# Patient Record
Sex: MALE | Race: WHITE | URBAN - NONMETROPOLITAN AREA
[De-identification: names, ages, dates, MRNs, and addresses within clinical notes are randomized per-mention and may not be internally consistent; named-entity substitution may affect disease eponyms.]

---

## 2021-11-14 ENCOUNTER — TELEPHONE (OUTPATIENT)
Dept: PRIMARY CARE CLINIC | Age: 56
End: 2021-11-14

## 2021-11-14 ENCOUNTER — OFFICE VISIT (OUTPATIENT)
Dept: PRIMARY CARE CLINIC | Age: 56
End: 2021-11-14
Payer: COMMERCIAL

## 2021-11-14 ENCOUNTER — HOSPITAL ENCOUNTER (OUTPATIENT)
Age: 56
Setting detail: SPECIMEN
Discharge: HOME | End: 2021-11-14
Payer: COMMERCIAL

## 2021-11-14 VITALS
HEART RATE: 96 BPM | HEIGHT: 67 IN | TEMPERATURE: 97.3 F | BODY MASS INDEX: 18.83 KG/M2 | DIASTOLIC BLOOD PRESSURE: 70 MMHG | WEIGHT: 120 LBS | OXYGEN SATURATION: 99 % | SYSTOLIC BLOOD PRESSURE: 130 MMHG

## 2021-11-14 DIAGNOSIS — M79.10 MYALGIA: Primary | ICD-10-CM

## 2021-11-14 DIAGNOSIS — M79.10 MYALGIA: ICD-10-CM

## 2021-11-14 DIAGNOSIS — Z72.0 TOBACCO USE: ICD-10-CM

## 2021-11-14 DIAGNOSIS — U07.1 COVID-19: ICD-10-CM

## 2021-11-14 PROBLEM — H25.13 AGE-RELATED NUCLEAR CATARACT OF BOTH EYES: Status: ACTIVE | Noted: 2021-11-14

## 2021-11-14 PROBLEM — H52.4 PRESBYOPIA: Status: ACTIVE | Noted: 2021-11-14

## 2021-11-14 PROBLEM — E78.5 HYPERLIPIDEMIA: Status: ACTIVE | Noted: 2021-11-14

## 2021-11-14 PROBLEM — J44.9 CHRONIC OBSTRUCTIVE PULMONARY DISEASE (HCC): Status: ACTIVE | Noted: 2021-11-14

## 2021-11-14 PROBLEM — M19.90 OSTEOARTHRITIS: Status: ACTIVE | Noted: 2021-11-14

## 2021-11-14 LAB
SARS-COV-2, RAPID: DETECTED
SPECIMEN DESCRIPTION: ABNORMAL

## 2021-11-14 PROCEDURE — 87635 SARS-COV-2 COVID-19 AMP PRB: CPT

## 2021-11-14 PROCEDURE — 99204 OFFICE O/P NEW MOD 45 MIN: CPT | Performed by: FAMILY MEDICINE

## 2021-11-14 RX ORDER — LOVASTATIN 10 MG/1
TABLET ORAL
COMMUNITY
End: 2021-11-14

## 2021-11-14 RX ORDER — SODIUM CHLORIDE 9 MG/ML
INJECTION, SOLUTION INTRAVENOUS CONTINUOUS
OUTPATIENT
Start: 2021-11-15

## 2021-11-14 RX ORDER — HEPARIN SODIUM (PORCINE) LOCK FLUSH IV SOLN 100 UNIT/ML 100 UNIT/ML
500 SOLUTION INTRAVENOUS PRN
Status: CANCELLED | OUTPATIENT
Start: 2021-11-15

## 2021-11-14 RX ORDER — DIPHENHYDRAMINE HYDROCHLORIDE 50 MG/ML
50 INJECTION INTRAMUSCULAR; INTRAVENOUS ONCE
OUTPATIENT
Start: 2021-11-15 | End: 2021-11-15

## 2021-11-14 RX ORDER — SODIUM CHLORIDE 0.9 % (FLUSH) 0.9 %
5-40 SYRINGE (ML) INJECTION PRN
Status: CANCELLED | OUTPATIENT
Start: 2021-11-15

## 2021-11-14 RX ORDER — EPINEPHRINE 1 MG/ML
0.3 INJECTION, SOLUTION, CONCENTRATE INTRAVENOUS PRN
OUTPATIENT
Start: 2021-11-15

## 2021-11-14 RX ORDER — SODIUM CHLORIDE 9 MG/ML
INJECTION, SOLUTION INTRAVENOUS CONTINUOUS
Status: CANCELLED | OUTPATIENT
Start: 2021-11-15

## 2021-11-14 RX ORDER — SODIUM CHLORIDE 9 MG/ML
25 INJECTION, SOLUTION INTRAVENOUS PRN
Status: CANCELLED | OUTPATIENT
Start: 2021-11-15

## 2021-11-14 RX ORDER — METHYLPREDNISOLONE SODIUM SUCCINATE 125 MG/2ML
125 INJECTION, POWDER, LYOPHILIZED, FOR SOLUTION INTRAMUSCULAR; INTRAVENOUS ONCE
OUTPATIENT
Start: 2021-11-15 | End: 2021-11-15

## 2021-11-14 NOTE — TELEPHONE ENCOUNTER
The patient was seen at Urgent Care 11/14/21. A rapid Covid-19 test was positive. His symptoms began 11/12/2021. He qualifies for Regeneron, and he is interested. Regeneron infusion was ordered and signed in [facility]. Please call the patient to arrange a Regeneron infusion. Thank you.

## 2021-11-14 NOTE — PATIENT INSTRUCTIONS
Patient Education        Coronavirus (PNISV-29): Care Instructions  Overview  The coronavirus disease (COVID-19) is caused by a virus. Symptoms may include a fever, a cough, and shortness of breath. It can spread through droplets from coughing and sneezing, breathing, and singing. The virus also can spread when people are in close contact with someone who is infected. Most people have mild symptoms and can take care of themselves at home. If their symptoms get worse, they may need care in a hospital. Treatment may include medicines to reduce symptoms, plus breathing support such as oxygen therapy or a ventilator. It's important to not spread the virus to others. If you have COVID-19, wear a mask anytime you are around other people. It can help stop the spread of the virus. You need to isolate yourself while you are sick. Leave your home only if you need to get medical care or testing. Follow-up care is a key part of your treatment and safety. Be sure to make and go to all appointments, and call your doctor if you are having problems. It's also a good idea to know your test results and keep a list of the medicines you take. How can you care for yourself at home? · Get extra rest. It can help you feel better. · Drink plenty of fluids. This helps replace fluids lost from fever. Fluids may also help ease a scratchy throat. · You can take acetaminophen (Tylenol) or ibuprofen (Advil, Motrin) to reduce a fever. It may also help with muscle and body aches. Read and follow all instructions on the label. · Use petroleum jelly on sore skin. This can help if the skin around your nose and lips becomes sore from rubbing a lot with tissues. If you use oxygen, use a water-based product instead of petroleum jelly. · Keep track of symptoms such as fever and shortness of breath. This can help you know if you need to call your doctor. It can also help you know when it's safe to be around other people.   · In some cases, your doctor might suggest that you get a pulse oximeter. How can you self-isolate when you have COVID-19? If you have COVID-19, there are things you can do to help avoid spreading the virus to others. · Limit contact with people in your home. If possible, stay in a separate bedroom and use a separate bathroom. · Wear a mask when you are around other people. · If you have to leave home, avoid crowds and try to stay at least 6 feet away from other people. · Avoid contact with pets and other animals. · Cover your mouth and nose with a tissue when you cough or sneeze. Then throw it in the trash right away. · Wash your hands often, especially after you cough or sneeze. Use soap and water, and scrub for at least 20 seconds. If soap and water aren't available, use an alcohol-based hand . · Don't share personal household items. These include bedding, towels, cups and glasses, and eating utensils. · [address] in the warmest water allowed for the fabric type, and dry it completely. It's okay to wash other people's laundry with yours. · Clean and disinfect your home. Use household  and disinfectant wipes or sprays. When can you end self-isolation for COVID-19? If you know or think that you have the virus, you will need to self-isolate. You can be around others after:  · It's been at least 10 days since your symptoms started and  · You haven't had a fever for 24 hours without taking medicines to lower the fever and  · Your symptoms are improving. If you tested positive but have no symptoms, you can end isolation after 10 days. But if you start to have symptoms, follow the steps above. Ask your doctor if you need to be tested before you end isolation. This is especially important if you have a weakened immune system. When should you call for help? Call 911 anytime you think you may need emergency care.  For example, call if you have life-threatening symptoms, such as:    · You have severe trouble breathing. (You can't talk at all.)     · You have constant chest pain or pressure.     · You are severely dizzy or lightheaded.     · You are confused or can't think clearly.     · You have pale, gray, or blue-colored skin or lips.     · You pass out (lose consciousness) or are very hard to wake up. Call your doctor now or seek immediate medical care if:    · You have moderate trouble breathing. (You can't speak a full sentence.)     · You are coughing up blood (more than about 1 teaspoon).     · You have signs of low blood pressure. These include feeling lightheaded; being too weak to stand; and having cold, pale, clammy skin. Watch closely for changes in your health, and be sure to contact your doctor if:    · Your symptoms get worse.     · You are not getting better as expected.     · You have new or worse symptoms of anxiety, depression, nightmares, or flashbacks. Call before you go to the doctor's office. Follow their instructions. And wear a mask. Current as of: July 1, 2021               Content Version: 13.0  © 2006-2021 [employer]. Care instructions adapted under license by [facility] ([facility]). If you have questions about a medical condition or this instruction, always ask your healthcare professional. [address] any warranty or liability for your use of this information. Patient Education        Learning About Monoclonal Antibody Treatment for COVID-19  How are monoclonal antibodies used to treat COVID-19? Monoclonal antibodies are human-made substances that act like natural antibodies. Antibodies are proteins that your immune system makes to help defend against a threat, like bacteria and viruses. Monoclonal antibodies for COVID-19 target the virus that causes the infection. When the virus enters your body, it invades healthy cells and starts to make more virus. This can make you sick.  These antibodies may block the virus from getting into your cells so it can't make more virus. This may help limit the illness. And it may help you stay out of the hospital.  This treatment is given as an IV infusion, through a needle in a vein. It's only given one time, usually in a hospital or clinic. It may take about an hour to get the treatment. And you may need to stay for a while afterward. That's so you can be watched for an allergic reaction or other problems. This treatment for COVID-19 is still being studied. So there's a lot that isn't known about it yet. Your doctor can help you understand if this treatment might be right for you. Current as of: March 26, 2021               Content Version: 13.0  © 2006-2021 [employer]. Care instructions adapted under license by [facility] ([facility]). If you have questions about a medical condition or this instruction, always ask your healthcare professional. [place] [address] any warranty or liability for your use of this information. MONOCLONAL ANTIBODIES FOR HIGH-RISK COVID-19 POSITIVE PATIENTS    If youve tested positive for COVID-19, one of the first questions you may have is, What can I do to reduce the risk of getting sicker? The good news is, there are treatments that may reduce that risk. Depending on your age, health history, and how long youve had symptoms of COVID-19, you may qualify for a promising form of treatment for the disease. Its called monoclonal antibody (mAb) treatment. Some early evidence suggests that mAb treatment can reduce the amount of the SARS-CoV-2 virus (the virus that causes COVID-19) in a person's system. This amount is known as viral load.  Having a lower viral load means you may have milder symptoms thereby decreasing the likelihood of you needing to stay in the hospital.    mAb treatment may help people who:    Have a positive COVID-19 test, and had symptoms for 10 days or less  Are at high risk of getting more serious symptoms    WHAT IS A MONOCLONAL ANTIBODY? Your body naturally makes antibodies to fight infection. However, your body may not have antibodies designed to recognize a novel (or new) virus like SARS-CoV-2, the virus that causes COVID-19. Monoclonal antibodies, or mAbs, are made in a laboratory to fight a particular infection--in this case, SARS-CoV-2--and are given to patients directly with an infusion. Thats why mAb treatment may help patients who are at high risk for serious symptoms or having to stay in the hospital.    mAb treatment for COVID-19 is different from a COVID-19 vaccine. A vaccine triggers your bodys natural immune response, but can take weeks to develop enough antibodies and prevent some kinds of infection. Some vaccines for COVID-19 require two shots, so your body can develop its own immune response to the disease. But if you already have the virus, mAb treatment gives your body the antibodies it needs to protect itself. WHAT CAN I EXPECT FROM TREATMENT (INFUSION)? mAb treatment happens at an infusion center because the treatment is given through an intravenous (IV) infusion. Depending on the mAb treatment you receive, the whole process takes about 2 to 3 hours. First, medical staff conduct a screening; then they start an IV, which delivers the mAbs to your body in just over an hour. Afterward, the medical staff will have you stay at the infusion center for another hour to be sure you arent having an allergic reaction or other side effects. These reactions are rare, but the staff must observe you for this hour. Then youll be released to go home. Its important to know that even if you start feeling better, you could still spread the virus for a while. So, youll need to isolate yourself (be alone) until all of these things happen:     At least 10 days have passed since your first symptoms of COVID-19  You havent had a fever in at least 24 hours, without taking any medicine that reduces fever  Your other symptoms of COVID-19 are improving  IMPORTANT: Follow your healthcare providers instructions. Your personal health history may require you to meet additional conditions. Also, if you start to feel worse, dont hesitate to seek medical care. CAN ANTIBODY TREATMENT MAKE ME SICK? Antibody treatments dont contain any live SARS-CoV-2, so theres no risk youll get COVID-19 from mAb treatment. However, antibody treatment may have side effects: Allergic reactions can happen during and after an antibody infusion. Tell your healthcare provider right away if you get any of the following signs and symptoms of allergic reactions: fever; chills; nausea; headache; shortness of breath; low blood pressure; wheezing; swelling of your lips, face, or throat; rash, including hives; itching; muscle aches; and/or dizziness. An infusion of any medicine may cause brief pain, bleeding, bruising of the skin, soreness, swelling, and possible infection at the infusion site. These are not all the possible side effects of antibody treatment. Serious and unexpected side effects may happen. Some possible risks from antibody treatment are: It may interfere with your body's ability to fight off a future infection of SARS-CoV-2. It may reduce your bodys immune response to a vaccine for SARS-CoV-2.  mAb treatments for COVID-19, like other treatments authorized for emergency use by the U.S. Food and Drug Administration, are still being studied, so it's possible that we dont know all the risks yet. As researchers continue to study the virus and how mAb treatment affects it, well learn more about these possible risks. If you have any questions, please talk with your healthcare provider. This information was downloaded from the [employer] and [employer] on 9/21/2021.  [email or web address]

## 2021-11-14 NOTE — PROGRESS NOTES
The patient was seen at Urgent Care 11/14/21. A Covid-19 test was positive. His symptoms began 11/12/2021. He qualifies for Hydrobee, and he is interested.

## 2021-11-14 NOTE — PROGRESS NOTES
[facility]             [address] [place] [address], [facility] [place], [address]                        Telephone [phone]             Fax [phone]       Hector Walker  1965  MRN:  [id number]  Date of visit:  11/14/2021       Assessment & Plan:    Myalgia  A rapid Covid-19 test was done today. He will be contacted with the results. Printed information regarding Coronavirus (COVID-19) was provided to the patient with the after visit summary. Printed information regarding Learning About Monoclonal Antibody Treatment for COVID-19 was provided to the patient with the after visit summary. Addendum:  Rapid Covid-19 testing was positive. Regeneron orders will be placed. He will be contacted tomorrow regarding scheduling. Subjective:    Hector Walker is a 64 y.o. male who presents to [facility] today (11/14/2021) for evaluation of:  Positive For Covid-19 (would like Regeneron; sxs started Fri; tested Pos with home test)      He states that he had an exposure to Covid-19 at work. He developed body aches, cough, and headache on 11/12/2021. He took a home test which was positive. He is interested in getting a Regeneron infusion. He has not had a Covid-19 vaccine. He does not take any prescription medications currently. He has No Known Allergies. He has the following problem list:  Patient Active Problem List   Diagnosis    Age-related nuclear cataract of both eyes    Chronic obstructive pulmonary disease ([address]    Hyperlipidemia    Osteoarthritis    Presbyopia    Tobacco use        He  reports that he has been smoking. He has never used smokeless tobacco.      Objective:    Vitals:    11/14/21 1314   BP: 130/70   Pulse: 96   Temp: 97.3 °F (36.3 °C)   SpO2: 99%   Weight: 120 lb (54.4 kg)   Height: 5' 7\" (1.702 m)      SpO2: 99 %       Body mass index is 18.79 kg/m².     Thin male healthy-appearing, alert and cooperative. The tympanic membranes are clear bilaterally. Oropharynx has no erythema. There is no exudate. There is no tenderness over the frontal and maxillary sinuses bilaterally. Neck supple. No adenopathy. Chest:  Normal expansion. Clear to auscultation. No rales, rhonchi, or wheezing. Respirations are not labored. Heart sounds are normal.  Regular rate and rhythm without murmur, gallop or rub.       (Please note that portions of this note were completed with a voice-recognition program. Efforts were made to edit the dictation but occasionally words are mis-transcribed.)

## 2021-11-14 NOTE — PROGRESS NOTES
Lab called to report positive covid test on patient. Called patient, let him know the results, and that  [facility] will call him tomorrow with appt time for Regeneron infusion. Dr. Negrete Petties aware.

## 2021-11-22 ENCOUNTER — TELEPHONE (OUTPATIENT)
Dept: FAMILY MEDICINE CLINIC | Age: 56
End: 2021-11-22

## 2021-11-22 NOTE — TELEPHONE ENCOUNTER
Call to pt, states is feeling better, does not report any covid symptoms other than loss of taste and smell and he thinks these are returning slowly. Denies further c/o or questions.

## 2024-01-12 VITALS — BODY MASS INDEX: 18.02 KG/M2 | WEIGHT: 118.9 LBS | HEIGHT: 68 IN

## 2024-01-19 ENCOUNTER — HOSPITAL ENCOUNTER (OUTPATIENT)
Dept: CT IMAGING | Age: 59
Discharge: HOME | End: 2024-01-19
Payer: OTHER GOVERNMENT

## 2024-01-19 DIAGNOSIS — R91.1 SOLITARY PULMONARY NODULE: ICD-10-CM

## 2024-01-19 DIAGNOSIS — Z87.891 PERSONAL HISTORY OF TOBACCO USE: ICD-10-CM

## 2024-01-19 DIAGNOSIS — F17.210 CIGARETTE SMOKER: ICD-10-CM

## 2024-01-19 PROCEDURE — 71271 CT THORAX LUNG CANCER SCR C-: CPT
